# Patient Record
Sex: MALE | Race: WHITE
[De-identification: names, ages, dates, MRNs, and addresses within clinical notes are randomized per-mention and may not be internally consistent; named-entity substitution may affect disease eponyms.]

---

## 2017-01-19 NOTE — DX
Left Hand, 2 Views



HISTORY: Pain, osteoarthritis, M25.50.



TECHNIQUE: AP and ball-catcher's view.



FINDINGS: Mild joint space narrowing, subchondral sclerosis and osteophytes of the thumb interphalang
eal joint. Subchondral sclerosis of the first metacarpophalangeal joint. Mild joint space narrowing o
f the fifth distal interphalangeal joint. Small osteophytes involving the second distal interphalange
al joint region. No evidence of erosions. No destructive osseous lesions. No significant degenerative
 changes of the intercarpal joints or first carpometacarpal joint.



IMPRESSION: Mild osteoarthritis of the left hand especially in the thumb interphalangeal joint.

## 2017-01-19 NOTE — DX
Right hand, 2 views



HISTORY: Pain, osteoarthritis, M 25.50



TECHNIQUE: AP and ball-catcher's view.



FINDINGS: Mild joint space narrowing, subchondral sclerosis and osteophytes of the thumb interphalang
eal joint. No significant degenerative changes of the metacarpal phalangeal joints or second through 
fifth interphalangeal joints. No destructive osseous lesions. No significant degenerative changes of 
the intercarpal joints or first carpometacarpal joint.



IMPRESSION: Mild osteoarthritis of the right thumb interphalangeal joint.

## 2017-01-20 NOTE — MR
MRI of the Lumbar Spine (Without Contrast) at 0837 Hours



Clinical Indications: Back pain. M54.10.



Technique: Sagittal and axial T1 and T2 and sagittal STIR MR sequences of the lumbar spine without co
ntrast. Axial imaging from T11-S1.



Findings:  Lumbar vertebral bodies are of normal height without compression fractures.  Conus medulla
ris appears normal and ends at L1-L2. 



T11-T12: Circumferential disk bulge and right paramedian disk herniation resulting in moderate centra
l canal stenosis with slight cord compression and mild-to-moderate right neural foraminal stenosis. N
o cord edema.



T12-L1: No disk herniation or stenosis.



L1-L2: Mild bilateral facet arthropathy without disk herniation or stenosis.



L2-L3: Mild bilateral facet arthropathy without disk herniation or stenosis.



L3-L4: Severe degenerative disk disease with severe loss of disk height, broad-based central disk her
niation, extrusion, circumferential osteophytes, and moderate bilateral facet arthropathy resulting i
n moderate central canal stenosis and mild-to-moderate bilateral neural foraminal stenosis.



L4-L5: Mild degenerative disk disease with a left paramedian 5 mm disk herniation, protrusion, and mo
derate bilateral facet arthropathy resulting in moderate central canal stenosis, severe left lateral 
recess with dorsal displacement of the left-sided intrathecal nerve roots, and mild-to-moderate bilat
eral neural foraminal stenosis.



L5-S1: Moderate degenerative disk disease with moderate loss of disk height, right paramedian disk he
rniation, protrusion and mild bilateral facet arthropathy resulting in mild central canal stenosis an
d mild-to-moderate bilateral neural foraminal stenosis.



Impression:

1. T11-T12: Moderate central canal stenosis secondary to degenerative disk disease and right paramedi
an disk herniation.

2. L3-L4: Moderate central canal stenosis secondary to broad-based central disk herniation, degenerat
ivy disk disease and facet arthropathy.

3. L4-L5: Moderate central canal stenosis secondary to left paramedian disk herniation and facet arth
ropathy.

4. L5-S1: Mild central canal stenosis secondary to right paramedian disk herniation.

5. Please see above findings at specific disk levels.

## 2019-03-21 ENCOUNTER — HOSPITAL ENCOUNTER (OUTPATIENT)
Dept: HOSPITAL 80 - FIMAGING | Age: 69
End: 2019-03-21
Attending: PHYSICIAN ASSISTANT
Payer: COMMERCIAL

## 2019-03-21 DIAGNOSIS — K44.9: ICD-10-CM

## 2019-03-21 DIAGNOSIS — J31.2: Primary | ICD-10-CM
